# Patient Record
Sex: MALE | Race: WHITE
[De-identification: names, ages, dates, MRNs, and addresses within clinical notes are randomized per-mention and may not be internally consistent; named-entity substitution may affect disease eponyms.]

---

## 2018-02-18 ENCOUNTER — HOSPITAL ENCOUNTER (EMERGENCY)
Dept: HOSPITAL 63 - ER | Age: 38
Discharge: HOME | End: 2018-02-18
Payer: COMMERCIAL

## 2018-02-18 VITALS — BODY MASS INDEX: 29.35 KG/M2 | WEIGHT: 205 LBS | HEIGHT: 70 IN

## 2018-02-18 VITALS — SYSTOLIC BLOOD PRESSURE: 135 MMHG | DIASTOLIC BLOOD PRESSURE: 88 MMHG

## 2018-02-18 DIAGNOSIS — Z88.8: ICD-10-CM

## 2018-02-18 DIAGNOSIS — F10.20: ICD-10-CM

## 2018-02-18 DIAGNOSIS — M62.830: Primary | ICD-10-CM

## 2018-02-18 DIAGNOSIS — I10: ICD-10-CM

## 2018-02-18 DIAGNOSIS — Z88.5: ICD-10-CM

## 2018-02-18 PROCEDURE — 99284 EMERGENCY DEPT VISIT MOD MDM: CPT

## 2018-02-18 PROCEDURE — 72072 X-RAY EXAM THORAC SPINE 3VWS: CPT

## 2018-02-18 NOTE — PHYS DOC
Past History


Past Medical History:  Hypertension, Other


Past Surgical History:  No Surgical History


Alcohol Use:  Heavy


Drug Use:  None





Adult General


Chief Complaint


Chief Complaint:  BACK INJURY





HPI


HPI





Patient is a 38 year old M who presents with right-sided back pain started 

after lifting a heavy bucket 3 days ago. He states that he reinjured the same 

area all lifting an animal. His symptoms are worse with movement and improved 

with rest and positioning. His pain does not radiate and is not associated with 

any other symptoms. He does have a history of ankylosing spondylitis.





Review of Systems


Review of Systems





Constitutional: Denies fever or chills []


Eyes: Denies change in visual acuity, redness, or eye pain []


HENT: Denies nasal congestion or sore throat []


Respiratory: Denies cough or shortness of breath []


Cardiovascular: No additional information not addressed in HPI []


GI: Denies abdominal pain, nausea, vomiting, bloody stools or diarrhea []


: Denies dysuria or hematuria []


Musculoskeletal: Negative except history of present illness


Integument: Denies rash or skin lesions []


Neurologic: Denies headache, focal weakness or sensory changes []


Endocrine: Denies polyuria or polydipsia []





All other systems were reviewed and found to be within normal limits, except as 

documented in this note.





Family History


Family History


No pertinent family medical history was reported





Current Medications


Current Medications





Current Medications








 Medications


  (Trade)  Dose


 Ordered  Sig/Jamie  Start Time


 Stop Time Status Last Admin


Dose Admin


 


 Diazepam


  (Valium)  10 mg  1X  ONCE  2/18/18 10:15


 2/18/18 10:16 DC 2/18/18 10:03


10 MG











Allergies


Allergies





Allergies








Coded Allergies Type Severity Reaction Last Updated Verified


 


  codeine Allergy Intermediate  9/29/14 Yes


 


  prochlorperazine Allergy Intermediate  9/29/14 Yes











Physical Exam


Physical Exam





Constitutional: Well developed, well nourished, no acute distress, non-toxic 

appearance. []


HENT: Normocephalic, atraumatic


Eyes:  EOMI, conjunctiva normal, no discharge. [] 


Neck: Normal range of motion, no tenderness, supple, no stridor. [] 


Cardiovascular:Heart rate regular rhythm, no murmur []


Lungs & Thorax:  Bilateral breath sounds clear to auscultation []


Skin: Warm, dry, no erythema, no rash. [] 


Back:  Right thoracic paraspinal muscle spasm noted with tenderness to 

palpation in the area


Extremities: No tenderness, no cyanosis, no clubbing, ROM intact, no edema. [] 


Neurologic: Alert and oriented X 3, normal motor function, normal sensory 

function, no focal deficits noted. []


Psychologic: Affect normal, judgement normal, mood normal. []





Current Patient Data


Vital Signs





 Vital Signs








  Date Time  Temp Pulse Resp B/P (MAP) Pulse Ox O2 Delivery O2 Flow Rate FiO2


 


2/18/18 09:30  87 16  99 Room Air  











EKG


EKG


[]





Radiology/Procedures


Radiology/Procedures


Thoracic spine x-ray


Impressions:


No acute disease noted per radiology report





Course & Med Decision Making


Course & Med Decision Making


Pertinent Labs and Imaging studies reviewed. (See chart for details)





[]





Dragon Disclaimer


Dragon Disclaimer


This electronic medical record was generated, in whole or in part, using a 

voice recognition dictation system.





Departure


Departure:


Impression:  


 Primary Impression:  


 Spasm of thoracic back muscle


Disposition:  01 HOME, SELF-CARE


Condition:  STABLE


Referrals:  


IRAM AL (PCP)


Patient Instructions:  Back Exercises





Additional Instructions:  


Albert was seen in the emergency department for back pain. No emergency medical 

condition was found on history or physical exam. He did have normal x-rays of 

his spine. His symptoms are most consistent with a muscle spasm. He is given a 

prescription for muscle relaxer and Voltaren gel for his pain. He was 

encouraged to continue daily activities and avoid heavy lifting. He was advised 

follow-up with his primary care doctor in the next 3-5 days for further 

management.


Scripts


Diclofenac Sodium (VOLTAREN) 100 Gm Gel..gram.


1 GM TP QID, #100 GM 2 Refills


   Prov: KAUSHIK COOL MD         2/18/18 


Baclofen (BACLOFEN) 10 Mg Tablet


1 TAB PO TID for 7 Days, #21 TAB 2 Refills


   Prov: KAUSHIK COOL MD         2/18/18











KAUSHIK COOL MD Feb 18, 2018 11:06

## 2018-02-18 NOTE — RAD
EXAM: Thoracic spine 3 views.



HISTORY: Back pain. Lifting injury.



COMPARISON: None.



FINDINGS: Alignment is maintained. No fractures are identified. Intervertebral

disc heights are maintained.



IMPRESSION:

1. No fracture or malalignment.

## 2018-08-21 ENCOUNTER — HOSPITAL ENCOUNTER (EMERGENCY)
Dept: HOSPITAL 63 - ER | Age: 38
LOS: 1 days | Discharge: HOME | End: 2018-08-22
Payer: COMMERCIAL

## 2018-08-21 VITALS — BODY MASS INDEX: 27.87 KG/M2 | WEIGHT: 194.67 LBS | HEIGHT: 70 IN

## 2018-08-21 DIAGNOSIS — Z88.5: ICD-10-CM

## 2018-08-21 DIAGNOSIS — R42: ICD-10-CM
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
## 2018-08-21 DIAGNOSIS — R74.0: ICD-10-CM

## 2018-08-21 DIAGNOSIS — I10: ICD-10-CM

## 2018-08-21 DIAGNOSIS — Z88.8: ICD-10-CM

## 2018-08-21 DIAGNOSIS — K59.00: Primary | ICD-10-CM

## 2018-08-21 DIAGNOSIS — F10.20: ICD-10-CM

## 2018-08-21 DIAGNOSIS — M79.7: ICD-10-CM

## 2018-08-21 DIAGNOSIS — K58.9: ICD-10-CM

## 2018-08-21 LAB
ALBUMIN SERPL-MCNC: 4.2 G/DL (ref 3.4–5)
ALP SERPL-CCNC: 89 U/L (ref 46–116)
ALT SERPL-CCNC: 61 U/L (ref 16–63)
AMPHETAMINE/METHAMPHETAMINE: (no result)
ANION GAP SERPL CALC-SCNC: 8 MMOL/L (ref 6–14)
APTT PPP: (no result) S
AST SERPL-CCNC: 50 U/L (ref 15–37)
BACTERIA #/AREA URNS HPF: 0 /HPF
BARBITURATES UR-MCNC: (no result) UG/ML
BASOPHILS # BLD AUTO: 0.1 X10^3/UL (ref 0–0.2)
BASOPHILS NFR BLD: 1 % (ref 0–3)
BENZODIAZ UR-MCNC: (no result) UG/L
BILIRUB DIRECT SERPL-MCNC: 0.1 MG/DL (ref 0–0.2)
BILIRUB SERPL-MCNC: 0.6 MG/DL (ref 0.2–1)
BILIRUB UR QL STRIP: (no result)
CA-I SERPL ISE-MCNC: 13 MG/DL (ref 8–26)
CALCIUM SERPL-MCNC: 9.2 MG/DL (ref 8.5–10.1)
CANNABINOIDS UR-MCNC: (no result) UG/L
CHLORIDE SERPL-SCNC: 102 MMOL/L (ref 98–107)
CO2 SERPL-SCNC: 30 MMOL/L (ref 21–32)
COCAINE UR-MCNC: (no result) NG/ML
CREAT SERPL-MCNC: 0.9 MG/DL (ref 0.7–1.3)
EOSINOPHIL NFR BLD: 0.2 X10^3/UL (ref 0–0.7)
EOSINOPHIL NFR BLD: 2 % (ref 0–3)
ERYTHROCYTE [DISTWIDTH] IN BLOOD BY AUTOMATED COUNT: 13.7 % (ref 11.5–14.5)
FIBRINOGEN PPP-MCNC: CLEAR MG/DL
GFR SERPLBLD BASED ON 1.73 SQ M-ARVRAT: 93.9 ML/MIN
GLUCOSE SERPL-MCNC: 85 MG/DL (ref 70–99)
GLUCOSE UR STRIP-MCNC: (no result) MG/DL
HCT VFR BLD CALC: 47.7 % (ref 39–53)
HGB BLD-MCNC: 16.8 G/DL (ref 13–17.5)
LIPASE: 185 U/L (ref 73–393)
LYMPHOCYTES # BLD: 2.2 X10^3/UL (ref 1–4.8)
LYMPHOCYTES NFR BLD AUTO: 29 % (ref 24–48)
MCH RBC QN AUTO: 35 PG (ref 25–35)
MCHC RBC AUTO-ENTMCNC: 35 G/DL (ref 31–37)
MCV RBC AUTO: 98 FL (ref 79–100)
METHADONE SERPL-MCNC: (no result) NG/ML
MONO #: 0.8 X10^3/UL (ref 0–1.1)
MONOCYTES NFR BLD: 10 % (ref 0–9)
NEUT #: 4.5 X10^3UL (ref 1.8–7.7)
NEUTROPHILS NFR BLD AUTO: 58 % (ref 31–73)
NITRITE UR QL STRIP: (no result)
OPIATES UR-MCNC: (no result) NG/ML
PCP SERPL-MCNC: (no result) MG/DL
PLATELET # BLD AUTO: 188 X10^3/UL (ref 140–400)
POTASSIUM SERPL-SCNC: 4.7 MMOL/L (ref 3.5–5.1)
PROT SERPL-MCNC: 7.6 G/DL (ref 6.4–8.2)
RBC # BLD AUTO: 4.88 X10^6/UL (ref 4.3–5.7)
RBC #/AREA URNS HPF: 0 /HPF (ref 0–2)
SODIUM SERPL-SCNC: 140 MMOL/L (ref 136–145)
SP GR UR STRIP: <=1.005
SQUAMOUS #/AREA URNS LPF: (no result) /LPF
UROBILINOGEN UR-MCNC: 0.2 MG/DL
WBC # BLD AUTO: 7.7 X10^3/UL (ref 4–11)
WBC #/AREA URNS HPF: 0 /HPF (ref 0–4)

## 2018-08-21 PROCEDURE — 80307 DRUG TEST PRSMV CHEM ANLYZR: CPT

## 2018-08-21 PROCEDURE — 99285 EMERGENCY DEPT VISIT HI MDM: CPT

## 2018-08-21 PROCEDURE — 81001 URINALYSIS AUTO W/SCOPE: CPT

## 2018-08-21 PROCEDURE — G0479 DRUG TEST PRESUMP NOT OPT: HCPCS

## 2018-08-21 PROCEDURE — 80048 BASIC METABOLIC PNL TOTAL CA: CPT

## 2018-08-21 PROCEDURE — 36415 COLL VENOUS BLD VENIPUNCTURE: CPT

## 2018-08-21 PROCEDURE — 80076 HEPATIC FUNCTION PANEL: CPT

## 2018-08-21 PROCEDURE — 85610 PROTHROMBIN TIME: CPT

## 2018-08-21 PROCEDURE — 74022 RADEX COMPL AQT ABD SERIES: CPT

## 2018-08-21 PROCEDURE — 96374 THER/PROPH/DIAG INJ IV PUSH: CPT

## 2018-08-21 PROCEDURE — S0028 INJECTION, FAMOTIDINE, 20 MG: HCPCS

## 2018-08-21 PROCEDURE — 74176 CT ABD & PELVIS W/O CONTRAST: CPT

## 2018-08-21 PROCEDURE — 96375 TX/PRO/DX INJ NEW DRUG ADDON: CPT

## 2018-08-21 PROCEDURE — 85025 COMPLETE CBC W/AUTO DIFF WBC: CPT

## 2018-08-21 PROCEDURE — 85730 THROMBOPLASTIN TIME PARTIAL: CPT

## 2018-08-21 PROCEDURE — 83690 ASSAY OF LIPASE: CPT

## 2018-08-21 PROCEDURE — 96361 HYDRATE IV INFUSION ADD-ON: CPT

## 2018-08-21 NOTE — ED.ADGEN
Past History


Past Medical History:  Fibromyalgia, Hypertension, Other


Past Surgical History:  No Surgical History


Alcohol Use:  Heavy


Drug Use:  None





Adult General


Chief Complaint


Chief Complaint


".. I got this back and abd. pain... Here on the Lt... more"  ". I ve had lots 

of gut problems.. I usually have diarrhea every day.. but I have not gone 

today... ".." I am retired from the [employer] for my fibromyalgia and other issues...

"



HPI


HPI





Patient is a 38 year old male who presents with above hx and complaints 

generalized abdomen pain with some localization to Lt. lower quadrant.  Pt. 

denies bad food intact.   No hx of travel.  No hx. changes meds.  No hx of 

specific ill contacts.  Pt. has hx of chronic diarrhea and irritable bowel 

symptoms.   Pt. denies any dark stools.  No hx of colon exam with biopsy.   Pt. 

does have hx fibromyalgia.    Has had recent course of antibiotics or ear 

infection and is on day 8 of the antibiotic.   Pt. still having some dizziness 

associated with the bilateral ear infections.   Pt. normally follows with Dr. Rodriguez.





Review of Systems


Review of Systems





Constitutional: Denies fever or chills []


Eyes: Denies change in visual acuity, redness, or eye pain []


HENT: Denies nasal congestion or sore throat [] Hx. of recent ear infections. 


Respiratory: Denies cough or shortness of breath []


Cardiovascular: No additional information not addressed in HPI []


GI: Complaints of  abdominal pain, nausea, . No vomiting, bloody stools or 

diarrhea [] Has some recent complaints of constipation. 


: Denies dysuria or hematuria []


Musculoskeletal: Denies back pain or joint pain []Chronic joint pain. 


Integument: Denies rash or skin lesions []


Neurologic: Hx face and  headache,. Dizzy complaints , no  focal weakness or 

sensory changes []


Endocrine: Denies polyuria or polydipsia []





All other systems were reviewed and found to be within normal limits, except as 

documented in this note.





Family History


Family History


Both mother and father have kidney stones





Current Medications


Current Medications





Current Medications








 Medications


  (Trade)  Dose


 Ordered  Sig/Jamie  Start Time


 Stop Time Status Last Admin


Dose Admin


 


 Famotidine


  (Pepcid Vial)  20 mg  1X  ONCE  8/21/18 21:00


 8/21/18 21:01 DC 8/21/18 21:02


20 MG


 


 Ketorolac


 Tromethamine


  (Toradol 30mg


 Vial)  30 mg  STK-MED ONCE  8/21/18 20:53


 8/21/18 20:54 DC  





 


 Lactated Ringer's  1,000 ml @ 


 1,000 mls/hr  Q1H  8/21/18 20:40


 8/21/18 21:39 DC 8/21/18 21:01


1,000 MLS/HR


 


 Magnesium


 Hydroxide


  (Milk Of


 Magnesia)  2,400 mg  1X  ONCE  8/21/18 23:45


 8/21/18 23:46 DC 8/21/18 00:09


2,400 MG


 


 Ondansetron HCl


  (Zofran)  8 mg  1X  ONCE  8/21/18 21:00


 8/21/18 21:01 DC 8/21/18 21:04


8 MG











Allergies


Allergies





Allergies








Coded Allergies Type Severity Reaction Last Updated Verified


 


  codeine Allergy Intermediate  9/29/14 Yes


 


  prochlorperazine Allergy Intermediate  9/29/14 Yes











Physical Exam


Physical Exam





Constitutional: Well developed, well nourished, moderate to  acute distress, non

-toxic appearance. []


HENT: Normocephalic, atraumatic, bilateral external ears normal, oropharynx 

moist, no oral exudates, nose normal. []No obvious TM injection.  No temporal 

art. tenderness


Eyes: PERRLA, EOMI, conjunctiva normal, no discharge. [] 


Neck: Normal range of motion, no tenderness, supple, no stridor. [] 


Cardiovascular:Heart rate regular rhythm, no murmur []


Lungs & Thorax:  Bilateral breath sounds equal at apexes on auscultation []


Abdomen: Bowel sounds normal, soft, generalized and some Lt. lower quadrant  

tenderness, no masses, no pulsatile masses. [] Distended.   Some Lt flank pain 

on percussion. 


Skin: Warm, dry, no erythema, no rash. [] 


Back: No tenderness,  Lt. CVA tenderness. [] 


Extremities: No tenderness, no cyanosis, no clubbing, ROM intact, no edema. [] 

No psoas or heel tap. 


Neurologic: Alert and oriented X 3, normal motor function, normal sensory 

function, no focal deficits noted. []DTR + 2 patella and brachial. 


Psychologic: Affect anxious, judgement normal, mood normal. []





Current Patient Data


Vital Signs





 Vital Signs








  Date Time  Temp Pulse Resp B/P (MAP) Pulse Ox O2 Delivery O2 Flow Rate FiO2


 


8/22/18 00:08  60 16 145/91 (109) 99 Room Air  


 


8/21/18 20:09 98.0       








Lab Results





 Laboratory Tests








Test


 8/21/18


20:05 8/21/18


20:41


 


Urine Collection Type Void   


 


Urine Color Straw   


 


Urine Clarity Clear   


 


Urine pH 6.5   


 


Urine Specific Gravity <=1.005   


 


Urine Protein


 Neg


(NEG-TRACE) 





 


Urine Glucose (UA)


 Neg mg/dL


(NEG) 





 


Urine Ketones (Stick)


 Neg mg/dL


(NEG) 





 


Urine Blood Neg (NEG)   


 


Urine Nitrite Neg (NEG)   


 


Urine Bilirubin Neg (NEG)   


 


Urine Urobilinogen Dipstick


 0.2 mg/dL (0.2


mg/dL) 





 


Urine Leukocyte Esterase Neg (NEG)   


 


Urine RBC 0 /HPF (0-2)   


 


Urine WBC 0 /HPF (0-4)   


 


Urine Squamous Epithelial


Cells Occ /LPF  


 





 


Urine Bacteria


 0 /HPF (0-FEW)


 





 


Urine Opiates Screen Neg (NEG)   


 


Urine Methadone Screen Neg (NEG)   


 


Urine Barbiturates Neg (NEG)   


 


Urine Phencyclidine Screen Neg (NEG)   


 


Urine


Amphetamine/Methamphetamine Neg (NEG)  


 





 


Urine Benzodiazepines Screen Neg (NEG)   


 


Urine Cocaine Screen Neg (NEG)   


 


Urine Cannabinoids Screen Neg (NEG)   


 


Urine Ethyl Alcohol Neg (NEG)   


 


White Blood Count


 


 7.7 x10^3/uL


(4.0-11.0)


 


Red Blood Count


 


 4.88 x10^6/uL


(4.30-5.70)


 


Hemoglobin


 


 16.8 g/dL


(13.0-17.5)


 


Hematocrit


 


 47.7 %


(39.0-53.0)


 


Mean Corpuscular Volume


 


 98 fL ()





 


Mean Corpuscular Hemoglobin  35 pg (25-35)  


 


Mean Corpuscular Hemoglobin


Concent 


 35 g/dL


(31-37)


 


Red Cell Distribution Width


 


 13.7 %


(11.5-14.5)


 


Platelet Count


 


 188 x10^3/uL


(140-400)


 


Neutrophils (%) (Auto)  58 % (31-73)  


 


Lymphocytes (%) (Auto)  29 % (24-48)  


 


Monocytes (%) (Auto)  10 % (0-9)  H


 


Eosinophils (%) (Auto)  2 % (0-3)  


 


Basophils (%) (Auto)  1 % (0-3)  


 


Neutrophils # (Auto)


 


 4.5 x10^3uL


(1.8-7.7)


 


Lymphocytes # (Auto)


 


 2.2 x10^3/uL


(1.0-4.8)


 


Monocytes # (Auto)


 


 0.8 x10^3/uL


(0.0-1.1)


 


Eosinophils # (Auto)


 


 0.2 x10^3/uL


(0.0-0.7)


 


Basophils # (Auto)


 


 0.1 x10^3/uL


(0.0-0.2)


 


Prothrombin Time


 


 10.3 SEC


(9.4-11.4)


 


Prothrombin Time INR  1.0 (0.9-1.1)  


 


PTT


 


 26 SEC (23-33)





 


Sodium Level


 


 140 mmol/L


(136-145)


 


Potassium Level


 


 4.7 mmol/L


(3.5-5.1)


 


Chloride Level


 


 102 mmol/L


()


 


Carbon Dioxide Level


 


 30 mmol/L


(21-32)


 


Anion Gap  8 (6-14)  


 


Blood Urea Nitrogen


 


 13 mg/dL


(8-26)


 


Creatinine


 


 0.9 mg/dL


(0.7-1.3)


 


Estimated GFR


(Cockcroft-Gault) 


 93.9  





 


Glucose Level


 


 85 mg/dL


(70-99)


 


Calcium Level


 


 9.2 mg/dL


(8.5-10.1)


 


Total Bilirubin


 


 0.6 mg/dL


(0.2-1.0)


 


Direct Bilirubin


 


 0.1 mg/dL


(0.0-0.2)


 


Aspartate Amino Transferase


(AST) 


 50 U/L (15-37)


H


 


Alanine Aminotransferase (ALT)


 


 61 U/L (16-63)





 


Alkaline Phosphatase


 


 89 U/L


()


 


Total Protein


 


 7.6 g/dL


(6.4-8.2)


 


Albumin


 


 4.2 g/dL


(3.4-5.0)


 


Lipase


 


 185 U/L


()











EKG


EKG


[]





Radiology/Procedures


Radiology/Procedures


My interpretation of Acute abd. film shows increased stool.  No free air under 

the diaphragm. 





CT of abd. Show no acute surgical process.  No hydronephrosis.  Increased 

stool. []





Course & Med Decision Making


Course & Med Decision Making


Pertinent Labs and Imaging studies reviewed. (See chart for details)





Finish current antibiotics.   Tylenol and Ibuprofen for pain.  Clear fluid diet 

only x 48 hrs.   Expect some diarrhea with the MOM.  Return if continue pain 

for CT with contrast- eval. for other bowel problems.  Consider Colon exam and 

biopsy.   Return if any concerns.





[]





Final Impression


Final Impression


1. Abd. Pain - Renal colic?[]


2. Hx Irritable Bowel


3. Fibromyalgia


4. Hx of recent Bilateral Ear Infections- on day 8 of antibiotics. 


5. Elevated AST =50


6. Constipation





Dragon Disclaimer


Dragon Disclaimer


This electronic medical record was generated, in whole or in part, using a 

voice recognition dictation system.











PAUL RAMAN MD Aug 21, 2018 19:25

## 2018-08-21 NOTE — RAD
PQRS Compliance statement:

 

One or more of the following individualized dose reduction techniques were

utilized for this examination:

1. Automated exposure control.

2. Adjustment of the mA and/or kV according to patient size.

3. Use of iterative reconstruction technique.

 

Indication:low pelvic pain, left flank pain<BR>no surgery hx

 

TECHNIQUE: CT abdomen and pelvis without IV contrast with multiplanar 

reformats.

 

COMPARISON: 9/29/2014

 

FINDINGS:

Limited exam due to lack of IV contrast. Heart is normal in size. Clear 

lung bases. Noncontrast appearance of the liver, spleen, gallbladder, 

pancreas, adrenals and kidneys is within normal limits. No nephrolithiasis

or hydronephrosis. No retroperitoneal or pelvic adenopathy. No free pelvic

fluid or ascites. No bowel obstruction. Normal appendix. Urinary bladder 

within normal limits. Prostate and seminal vesicles show no mass lesion. 

Bilateral varicoceles. No suspicious bony lesion.

 

IMPRESSION:

Limited exam due to lack of IV contrast.

1. No nephrolithiasis or hydronephrosis.

2. No bowel obstruction. Normal appendix.

 

Electronically signed by: Nam Gautam DO (8/21/2018 10:11 PM) [facility]

## 2018-08-21 NOTE — RAD
Indication: Low pelvic pain, left flank pain.

 

TECHNIQUE: AP chest and 3 views of the abdomen and pelvis

 

COMPARISON: CT from the same day

 

FINDINGS:

Heart is normal in size. Lungs are clear. No pneumothorax or pleural 

effusion. No pneumoperitoneum. No bowel obstruction. No abnormal calcific 

densities projecting over the kidneys or expected course of the ureters. 

Visualized bones are within normal limits.

 

IMPRESSION:

No acute findings.

 

Electronically signed by: Nam Gautam DO (8/21/2018 10:23 PM) [employer]

## 2018-08-22 VITALS
DIASTOLIC BLOOD PRESSURE: 91 MMHG | SYSTOLIC BLOOD PRESSURE: 145 MMHG | SYSTOLIC BLOOD PRESSURE: 145 MMHG | DIASTOLIC BLOOD PRESSURE: 91 MMHG

## 2019-06-07 ENCOUNTER — HOSPITAL ENCOUNTER (OUTPATIENT)
Dept: HOSPITAL 63 - LAB | Age: 39
Discharge: HOME | End: 2019-06-07
Attending: NURSE PRACTITIONER
Payer: COMMERCIAL

## 2019-06-07 DIAGNOSIS — E78.5: Primary | ICD-10-CM

## 2019-06-07 LAB
ALBUMIN SERPL-MCNC: 4.2 G/DL (ref 3.4–5)
ALBUMIN/GLOB SERPL: 1 {RATIO} (ref 1–1.7)
ALP SERPL-CCNC: 86 U/L (ref 46–116)
ALT SERPL-CCNC: 57 U/L (ref 16–63)
ANION GAP SERPL CALC-SCNC: 13 MMOL/L (ref 6–14)
AST SERPL-CCNC: 39 U/L (ref 15–37)
BILIRUB SERPL-MCNC: 0.5 MG/DL (ref 0.2–1)
BUN/CREAT SERPL: 14 (ref 6–20)
CA-I SERPL ISE-MCNC: 15 MG/DL (ref 8–26)
CALCIUM SERPL-MCNC: 9.2 MG/DL (ref 8.5–10.1)
CHLORIDE SERPL-SCNC: 102 MMOL/L (ref 98–107)
CO2 SERPL-SCNC: 25 MMOL/L (ref 21–32)
CREAT SERPL-MCNC: 1.1 MG/DL (ref 0.7–1.3)
GFR SERPLBLD BASED ON 1.73 SQ M-ARVRAT: 74.1 ML/MIN
GLOBULIN SER-MCNC: 4.2 G/DL (ref 2.2–3.8)
GLUCOSE SERPL-MCNC: 91 MG/DL (ref 70–99)
POTASSIUM SERPL-SCNC: 4.1 MMOL/L (ref 3.5–5.1)
PROT SERPL-MCNC: 8.4 G/DL (ref 6.4–8.2)
SODIUM SERPL-SCNC: 140 MMOL/L (ref 136–145)

## 2019-06-07 PROCEDURE — 36415 COLL VENOUS BLD VENIPUNCTURE: CPT

## 2019-06-07 PROCEDURE — 80053 COMPREHEN METABOLIC PANEL: CPT

## 2019-06-07 PROCEDURE — 80061 LIPID PANEL: CPT

## 2019-06-08 LAB
CHOLEST/HDLC SERPL: 6 {RATIO}
HDLC SERPL-MCNC: 37 MG/DL (ref 40–60)
TRIGL SERPL-MCNC: 696 MG/DL (ref 0–150)
VLDLC: 139 MG/DL (ref 0–40)

## 2019-06-09 LAB — LDLC: (no result) MG/DL (ref 0–100)
